# Patient Record
Sex: FEMALE | Race: WHITE | NOT HISPANIC OR LATINO | Employment: FULL TIME | URBAN - METROPOLITAN AREA
[De-identification: names, ages, dates, MRNs, and addresses within clinical notes are randomized per-mention and may not be internally consistent; named-entity substitution may affect disease eponyms.]

---

## 2020-10-20 ENCOUNTER — TRANSFERRED RECORDS (OUTPATIENT)
Dept: HEALTH INFORMATION MANAGEMENT | Facility: CLINIC | Age: 40
End: 2020-10-20

## 2020-10-27 ENCOUNTER — MEDICAL CORRESPONDENCE (OUTPATIENT)
Dept: HEALTH INFORMATION MANAGEMENT | Facility: CLINIC | Age: 40
End: 2020-10-27

## 2020-11-13 ENCOUNTER — TRANSCRIBE ORDERS (OUTPATIENT)
Dept: OTHER | Age: 40
End: 2020-11-13

## 2020-11-13 DIAGNOSIS — M25.441 FINGER JOINT SWELLING, RIGHT: Primary | ICD-10-CM

## 2020-11-13 DIAGNOSIS — M25.549 PAIN OF FINGER JOINT: ICD-10-CM

## 2020-12-28 ENCOUNTER — VIRTUAL VISIT (OUTPATIENT)
Dept: RHEUMATOLOGY | Facility: CLINIC | Age: 40
End: 2020-12-28
Attending: ORTHOPAEDIC SURGERY
Payer: COMMERCIAL

## 2020-12-28 DIAGNOSIS — M25.50 POLYARTHRALGIA: Primary | ICD-10-CM

## 2020-12-28 DIAGNOSIS — R76.8 POSITIVE ANA (ANTINUCLEAR ANTIBODY): ICD-10-CM

## 2020-12-28 DIAGNOSIS — M51.369 DDD (DEGENERATIVE DISC DISEASE), LUMBAR: ICD-10-CM

## 2020-12-28 DIAGNOSIS — M25.50 POLYARTHRALGIA: ICD-10-CM

## 2020-12-28 LAB
ALBUMIN SERPL-MCNC: 3.8 G/DL (ref 3.4–5)
ALP SERPL-CCNC: 61 U/L (ref 40–150)
ALT SERPL W P-5'-P-CCNC: 35 U/L (ref 0–50)
ANION GAP SERPL CALCULATED.3IONS-SCNC: 2 MMOL/L (ref 3–14)
AST SERPL W P-5'-P-CCNC: 20 U/L (ref 0–45)
BILIRUB SERPL-MCNC: 0.5 MG/DL (ref 0.2–1.3)
BUN SERPL-MCNC: 15 MG/DL (ref 7–30)
CALCIUM SERPL-MCNC: 9 MG/DL (ref 8.5–10.1)
CHLORIDE SERPL-SCNC: 108 MMOL/L (ref 94–109)
CK SERPL-CCNC: 97 U/L (ref 30–225)
CO2 SERPL-SCNC: 28 MMOL/L (ref 20–32)
CREAT SERPL-MCNC: 0.74 MG/DL (ref 0.52–1.04)
CRP SERPL-MCNC: <2.9 MG/L (ref 0–8)
ERYTHROCYTE [DISTWIDTH] IN BLOOD BY AUTOMATED COUNT: 12.8 % (ref 10–15)
ERYTHROCYTE [SEDIMENTATION RATE] IN BLOOD BY WESTERGREN METHOD: 15 MM/H (ref 0–20)
GFR SERPL CREATININE-BSD FRML MDRD: >90 ML/MIN/{1.73_M2}
GLUCOSE SERPL-MCNC: 91 MG/DL (ref 70–99)
HCT VFR BLD AUTO: 40.7 % (ref 35–47)
HGB BLD-MCNC: 13.4 G/DL (ref 11.7–15.7)
MCH RBC QN AUTO: 30.9 PG (ref 26.5–33)
MCHC RBC AUTO-ENTMCNC: 32.9 G/DL (ref 31.5–36.5)
MCV RBC AUTO: 94 FL (ref 78–100)
PLATELET # BLD AUTO: 231 10E9/L (ref 150–450)
POTASSIUM SERPL-SCNC: 4.1 MMOL/L (ref 3.4–5.3)
PROT SERPL-MCNC: 7.5 G/DL (ref 6.8–8.8)
RBC # BLD AUTO: 4.34 10E12/L (ref 3.8–5.2)
SODIUM SERPL-SCNC: 138 MMOL/L (ref 133–144)
WBC # BLD AUTO: 4.9 10E9/L (ref 4–11)

## 2020-12-28 PROCEDURE — 86200 CCP ANTIBODY: CPT | Performed by: STUDENT IN AN ORGANIZED HEALTH CARE EDUCATION/TRAINING PROGRAM

## 2020-12-28 PROCEDURE — 99204 OFFICE O/P NEW MOD 45 MIN: CPT | Mod: 95 | Performed by: STUDENT IN AN ORGANIZED HEALTH CARE EDUCATION/TRAINING PROGRAM

## 2020-12-28 PROCEDURE — 86140 C-REACTIVE PROTEIN: CPT | Performed by: STUDENT IN AN ORGANIZED HEALTH CARE EDUCATION/TRAINING PROGRAM

## 2020-12-28 PROCEDURE — 80053 COMPREHEN METABOLIC PANEL: CPT | Performed by: STUDENT IN AN ORGANIZED HEALTH CARE EDUCATION/TRAINING PROGRAM

## 2020-12-28 PROCEDURE — 86160 COMPLEMENT ANTIGEN: CPT | Performed by: STUDENT IN AN ORGANIZED HEALTH CARE EDUCATION/TRAINING PROGRAM

## 2020-12-28 PROCEDURE — 82550 ASSAY OF CK (CPK): CPT | Performed by: STUDENT IN AN ORGANIZED HEALTH CARE EDUCATION/TRAINING PROGRAM

## 2020-12-28 PROCEDURE — 85652 RBC SED RATE AUTOMATED: CPT | Performed by: STUDENT IN AN ORGANIZED HEALTH CARE EDUCATION/TRAINING PROGRAM

## 2020-12-28 PROCEDURE — 85027 COMPLETE CBC AUTOMATED: CPT | Performed by: STUDENT IN AN ORGANIZED HEALTH CARE EDUCATION/TRAINING PROGRAM

## 2020-12-28 PROCEDURE — 86235 NUCLEAR ANTIGEN ANTIBODY: CPT | Performed by: STUDENT IN AN ORGANIZED HEALTH CARE EDUCATION/TRAINING PROGRAM

## 2020-12-28 PROCEDURE — 86225 DNA ANTIBODY NATIVE: CPT | Performed by: STUDENT IN AN ORGANIZED HEALTH CARE EDUCATION/TRAINING PROGRAM

## 2020-12-28 PROCEDURE — 86039 ANTINUCLEAR ANTIBODIES (ANA): CPT | Performed by: STUDENT IN AN ORGANIZED HEALTH CARE EDUCATION/TRAINING PROGRAM

## 2020-12-28 PROCEDURE — 86431 RHEUMATOID FACTOR QUANT: CPT | Performed by: STUDENT IN AN ORGANIZED HEALTH CARE EDUCATION/TRAINING PROGRAM

## 2020-12-28 PROCEDURE — 36415 COLL VENOUS BLD VENIPUNCTURE: CPT | Performed by: STUDENT IN AN ORGANIZED HEALTH CARE EDUCATION/TRAINING PROGRAM

## 2020-12-28 PROCEDURE — 86038 ANTINUCLEAR ANTIBODIES: CPT | Performed by: STUDENT IN AN ORGANIZED HEALTH CARE EDUCATION/TRAINING PROGRAM

## 2020-12-28 RX ORDER — FLUOXETINE 40 MG/1
40 CAPSULE ORAL
COMMUNITY
Start: 2020-12-10

## 2020-12-28 NOTE — LETTER
December 31, 2020      Pablo Ellington  [address] [place] [address]        Dear ,    We are writing to inform you of your test results.    Your blood test so far are normal. Liver, kidney function test, CBC, inflammation marker ESR CRP are normal.  Rest of the labs are pending.     Resulted Orders   Comprehensive metabolic panel   Result Value Ref Range    Sodium 138 133 - 144 mmol/L    Potassium 4.1 3.4 - 5.3 mmol/L    Chloride 108 94 - 109 mmol/L    Carbon Dioxide 28 20 - 32 mmol/L    Anion Gap 2 (L) 3 - 14 mmol/L    Glucose 91 70 - 99 mg/dL      Comment:      Non Fasting    Urea Nitrogen 15 7 - 30 mg/dL    Creatinine 0.74 0.52 - 1.04 mg/dL    GFR Estimate >90 >60 mL/min/[1.73_m2]      Comment:      Non  GFR Calc  Starting 12/18/2018, serum creatinine based estimated GFR (eGFR) will be   calculated using the Chronic Kidney Disease Epidemiology Collaboration   (CKD-EPI) equation.      GFR Estimate If Black >90 >60 mL/min/[1.73_m2]      Comment:       GFR Calc  Starting 12/18/2018, serum creatinine based estimated GFR (eGFR) will be   calculated using the Chronic Kidney Disease Epidemiology Collaboration   (CKD-EPI) equation.      Calcium 9.0 8.5 - 10.1 mg/dL    Bilirubin Total 0.5 0.2 - 1.3 mg/dL    Albumin 3.8 3.4 - 5.0 g/dL    Protein Total 7.5 6.8 - 8.8 g/dL    Alkaline Phosphatase 61 40 - 150 U/L    ALT 35 0 - 50 U/L    AST 20 0 - 45 U/L   CBC with platelets   Result Value Ref Range    WBC 4.9 4.0 - 11.0 10e9/L    RBC Count 4.34 3.8 - 5.2 10e12/L    Hemoglobin 13.4 11.7 - 15.7 g/dL    Hematocrit 40.7 35.0 - 47.0 %    MCV 94 78 - 100 fl    MCH 30.9 26.5 - 33.0 pg    MCHC 32.9 31.5 - 36.5 g/dL    RDW 12.8 10.0 - 15.0 %    Platelet Count 231 150 - 450 10e9/L   CK total   Result Value Ref Range    CK Total 97 30 - 225 U/L   CRP inflammation   Result Value Ref Range    CRP Inflammation <2.9 0.0 - 8.0 mg/L   Erythrocyte sedimentation rate auto   Result Value Ref Range     Sed Rate 15 0 - 20 mm/h       If you have any questions or concerns, please call the clinic at the number listed above.       Sincerely,      Dorota Giles MD

## 2020-12-28 NOTE — PROGRESS NOTES
"Pablo Ellington is a 40 year old female who is being evaluated via a billable video visit.      The patient has been notified of following:     \"This video visit will be conducted via a call between you and your physician/provider. We have found that certain health care needs can be provided without the need for an in-person physical exam.  This service lets us provide the care you need with a video conversation.  If a prescription is necessary we can send it directly to your pharmacy.  If lab work is needed we can place an order for that and you can then stop by our lab to have the test done at a later time.    Video visits are billed at different rates depending on your insurance coverage.  Please reach out to your insurance provider with any questions.    If during the course of the call the physician/provider feels a video visit is not appropriate, you will not be charged for this service.\"    Video visit - please send text invite to [phone]  Patient has given verbal consent for Video visit? Yes  How would you like to obtain your AVS? Mail a copy  If you are dropped from the video visit, the video invite should be resent to: Text to cell phone: [phone]  Will anyone else be joining your video visit? No      Ciara Denise LPN    Rheumatology / Pulmonology  [facility]           Active Problem List:     Patient Active Problem List    Diagnosis Date Noted     Positive MONROE (antinuclear antibody)      Priority: Medium     Polyarthralgia      Priority: Medium     DDD (degenerative disc disease), lumbar      Priority: Medium            History of Present Illness:   Pablo Ellington is a 40 year old female with no significant PMH evaluated via a billable virtual visit in consultation at request of Dr Paredes in Orthopedics for evaluation of right second MCP joint pain.     She reports pain in her right MCP joint for more than a year.  In September her joint got very swollen and painful.  " Denies pain in other MCP, PIP, DIP joints or wrists.  Denies any pain in elbows, ankles, MTP joints.  She has noticed achiness in bilateral shoulders, neck, lower back and occasionally in the right big toe.  In the morning she has stiffness in hands, back, shoulder, neck.  Stiffness is mostly in her right second MCP joint but also noted in the right ring finger and thumb. She take ibuprofen 400 mg 1 - 2 times a day.  She has seen chiropractor and was diagnosed with degenerative disc disease in the lumbar spine.      She had autoimmune testing done in May 2020 which revealed positive MONROE of 1: 160, negative rheumatoid factor, anti-CCP, elevated sed rate of 21, negative IZABEL panel, normal complements.  Her double-stranded DNA was elevated at 10 in May but repeat testing 2 weeks later was normal.    She received intra-articular steroid injection by orthopedic in the second right MCP joint which helped to reduce the pain and inflammation but for the last few weeks again she has noticed swelling and pain coming back.    She reports family history of lupus in her mother and history of rheumatoid arthritis in her maternal aunt.        She has noticed left sided chest pains. She had plantar fascitis a year ago, it is better now.  Denies history of psoriasis, ulcerative colitis or chron's disease. No h/o iritis, enthesitis, finger or toe swelling like dactylitis or heel pain. She has seen white discoloration in her fingers for years.     Denies malar rash, photosensitivity, recurrent mouth/genital ulcers, sicca symptoms, pleuritic chest pains, recurrent sinusitis/rhinitis, swallowing difficulty, hearing or visual changes recently. No h/o arterial/venous thrombosis in the past. Denies any tick bite, recent GI/ infection.             Review of Systems:     Review Of Systems  Constitutional: denies fever, chills, night sweats and weight loss.  Skin: No skin rash.  Eyes: No dryness or irritation in eyes. No episode of eye  inflammation or redness.   Ears/Nose/Throat: no recurrent sinus infections.  Respiratory: No shortness of breath, dyspnea on exertion, cough, or hemoptysis  Cardiovascular: + chest pain or palpitations.  Gastrointestinal: no nausea, vomiting, abdominal pain.  Normal bowel movements.  Genitourinary: no dysuria, frequency  or hematuria.  Musculoskeletal: as in HPI  Neurologic: no numbness, tingling.  Psychiatric: no mood disorders.  Hematologic/Lymphatic/Immunologic: no history of easy bruising, petechia or purpura.  No abnormal bleeding.   Endocrine: no h/o thyroid disease or Diabetes.                  Past Medical History:     Past Medical History:   Diagnosis Date     DDD (degenerative disc disease), lumbar      Polyarthralgia      Positive MONROE (antinuclear antibody)      Past Surgical History:   Procedure Laterality Date     NO HISTORY OF SURGERY              Social History:     Social History     Occupational History     Not on file   Tobacco Use     Smoking status: Not on file   Substance and Sexual Activity     Alcohol use: Not on file     Drug use: Not on file     Sexual activity: Not on file            Family History:   History reviewed. No pertinent family history.         Allergies:   No Known Allergies         Medications:     Current Outpatient Medications   Medication Sig Dispense Refill     FLUoxetine (PROZAC) 40 MG capsule Take 40 mg by mouth              Physical Exam:   Vitals not taken.   Wt Readings from Last 4 Encounters:   No data found for Wt       Constitutional: Obese, appearing stated age; cooperative  MS: Slight swelling and tenderness noted over the right second MCP joint.  No swelling noted over other MCP, PIP joint.  She can make a complete fist.  No tenderness reported over bilateral wrists, elbows.  Range of motion of bilateral shoulders is painful.  No knee effusions.  No tenderness reported bilateral ankles, MTP joints.  Psych: nl judgement, orientation, memory, affect.         Data:      Results for orders placed or performed in visit on 12/28/20   CBC with platelets     Status: None   Result Value Ref Range    WBC 4.9 4.0 - 11.0 10e9/L    RBC Count 4.34 3.8 - 5.2 10e12/L    Hemoglobin 13.4 11.7 - 15.7 g/dL    Hematocrit 40.7 35.0 - 47.0 %    MCV 94 78 - 100 fl    MCH 30.9 26.5 - 33.0 pg    MCHC 32.9 31.5 - 36.5 g/dL    RDW 12.8 10.0 - 15.0 %    Platelet Count 231 150 - 450 10e9/L     Hemoglobin   Date Value Ref Range Status   12/28/2020 13.4 11.7 - 15.7 g/dL Final     Recent Labs   Lab Test 12/28/20  0944   WBC 4.9   HGB 13.4   HCT 40.7   MCV 94          Outside studies reviewed: Records from [place] [facility] reviewed    Reviewed Rheumatology lab flowsheet    Assessment    Polyarthralgia  Lumbar degenerative disc disease  Positive MONROE-1: 160  Family history of lupus in mother  Negative rheumatoid factor, anti-CCP-May 2020    Polyarthralgia: She has pain in the right second MCP joint for more than a year.  She has noticed mild stiffness in her right second, fourth MCP joint and the thumb joint.  Pain has been there for more than a year.  Intra-articular steroid injection done in September in the right second MCP joint helped to reduce the inflammation but it is coming back.  Her rheumatoid arthritis tests done in May 2020 were negative.  I will repeat her rheumatoid serologies, inflammatory markers.  She had positive MONROE which will be repeated.  Due to family history of lupus in her mother will get other specific serologies including IZABEL panel, complements, double-stranded DNA, centromere antibody.  She does reports history of Raynaud's-like symptoms in her fingertips.    She has history of chronic pain in her lower back, neck and shoulder which could be related to degenerative disc disease.    If blood test will be normal then MRI of the right hand will be done to look for synovitis.    She can use ibuprofen 400-600 mg 2-3 times a day on as-needed basis.  In addition Voltaren gel can  be used topically over the painful joint.    Plan    Blood tests ordered    Based on the blood test results MRI can be considered    Follow-up in 4 weeks.          Video-Visit Details    Type of service:  Video Visit    Video Start Time: 8:40 AM   Video End Time: 9:20 AM     Originating Location (pt. Location): Home    Distant Location (provider location):  [facility]     Platform used for Video Visit: Anne Giles MD

## 2020-12-28 NOTE — RESULT ENCOUNTER NOTE
Karissa Shah,     Your blood test so far are normal. Liver, kidney function test, CBC, inflammation marker ESR CRP are normal.  Rest of the labs are pending.    Dorota Giles MD

## 2020-12-28 NOTE — LETTER
February 5, 2021      Pablo Ellington  [address] [place] [address]        Dear ,    We are writing to inform you of your test results.    Your autoimmune work-up has shown positive MONROE, positive double-stranded DNA and slightly positive RNP antibody.  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  You do not meet criteria for systemic lupus but due to family history of lupus in mother there is a concern about undifferentiated process which may or may not evolve into systemic lupus.     Since your hand x-ray was normal, I will order MRI of the right hand to evaluate for inflammation in the joint. Please call [phone] to schedule the MRI.     Resulted Orders   Comprehensive metabolic panel   Result Value Ref Range    Sodium 138 133 - 144 mmol/L    Potassium 4.1 3.4 - 5.3 mmol/L    Chloride 108 94 - 109 mmol/L    Carbon Dioxide 28 20 - 32 mmol/L    Anion Gap 2 (L) 3 - 14 mmol/L    Glucose 91 70 - 99 mg/dL      Comment:      Non Fasting    Urea Nitrogen 15 7 - 30 mg/dL    Creatinine 0.74 0.52 - 1.04 mg/dL    GFR Estimate >90 >60 mL/min/[1.73_m2]      Comment:      Non  GFR Calc  Starting 12/18/2018, serum creatinine based estimated GFR (eGFR) will be   calculated using the Chronic Kidney Disease Epidemiology Collaboration   (CKD-EPI) equation.      GFR Estimate If Black >90 >60 mL/min/[1.73_m2]      Comment:       GFR Calc  Starting 12/18/2018, serum creatinine based estimated GFR (eGFR) will be   calculated using the Chronic Kidney Disease Epidemiology Collaboration   (CKD-EPI) equation.      Calcium 9.0 8.5 - 10.1 mg/dL    Bilirubin Total 0.5 0.2 - 1.3 mg/dL    Albumin 3.8 3.4 - 5.0 g/dL    Protein Total 7.5 6.8 - 8.8 g/dL    Alkaline Phosphatase 61 40 - 150 U/L    ALT 35 0 - 50 U/L    AST 20 0 - 45 U/L   CBC with platelets   Result Value Ref Range    WBC 4.9 4.0 - 11.0 10e9/L    RBC Count 4.34 3.8 -  5.2 10e12/L    Hemoglobin 13.4 11.7 - 15.7 g/dL    Hematocrit 40.7 35.0 - 47.0 %    MCV 94 78 - 100 fl    MCH 30.9 26.5 - 33.0 pg    MCHC 32.9 31.5 - 36.5 g/dL    RDW 12.8 10.0 - 15.0 %    Platelet Count 231 150 - 450 10e9/L   Anti Nuclear Alejandrina IgG by IFA with Reflex   Result Value Ref Range    MONROE interpretation Borderline Positive (A) NEG^Negative      Comment:                                         Reference range:  <1:40  NEGATIVE  1:40 - 1:80  BORDERLINE POSITIVE  >1:80 POSITIVE      MONROE pattern 1 SPECKLED     MONROE titer 1 1:80    Rheumatoid factor   Result Value Ref Range    Rheumatoid Factor <7 <12 IU/mL   CK total   Result Value Ref Range    CK Total 97 30 - 225 U/L   Centromere Antibody IgG   Result Value Ref Range    Centromere Antibody IgG <0.2 0.0 - 0.9 AI      Comment:      Negative  Antibody index (AI) values reflect qualitative changes in antibody   concentration that cannot be directly associated with clinical condition or   disease state.     CRP inflammation   Result Value Ref Range    CRP Inflammation <2.9 0.0 - 8.0 mg/L   DNA double stranded antibodies   Result Value Ref Range    DNA-ds 15 (H) <10 IU/mL      Comment:      Equivocal (qualifier value)   Complement C4   Result Value Ref Range    Complement C4 21 13 - 39 mg/dL   Complement C3   Result Value Ref Range    Complement C3 126 81 - 157 mg/dL   IZABEL antibody panel   Result Value Ref Range    RNP Antibody IgG 2.4 (H) 0.0 - 0.9 AI      Comment:      Positive  Antibody index (AI) values reflect qualitative changes in antibody   concentration that cannot be directly associated with clinical condition or   disease state.      Hook IZABEL Antibody IgG <0.2 0.0 - 0.9 AI      Comment:      Negative  Antibody index (AI) values reflect qualitative changes in antibody   concentration that cannot be directly associated with clinical condition or   disease state.      SSA (Ro) (IZABEL) Antibody, IgG <0.2 0.0 - 0.9 AI      Comment:      Negative  Antibody index  (AI) values reflect qualitative changes in antibody   concentration that cannot be directly associated with clinical condition or   disease state.      SSB (La) (IZABEL) Antibody, IgG <0.2 0.0 - 0.9 AI      Comment:      Negative  Antibody index (AI) values reflect qualitative changes in antibody   concentration that cannot be directly associated with clinical condition or   disease state.     Cyclic Citrullinated Peptide Antibody IgG   Result Value Ref Range    Cyclic Citrullinated Peptide Antibody, IgG 2 <7 U/mL      Comment:      Negative   Erythrocyte sedimentation rate auto   Result Value Ref Range    Sed Rate 15 0 - 20 mm/h               If you have any questions or concerns, please call the clinic at the number listed above.       Sincerely,      Dorota Giles MD

## 2020-12-28 NOTE — PATIENT INSTRUCTIONS
Blood tests ordered     She can get it done today at [place]    Follow up in 4 weeks. FEB 1ST 12:30

## 2020-12-29 LAB
ANA PAT SER IF-IMP: ABNORMAL
ANA SER QL IF: ABNORMAL
ANA TITR SER IF: ABNORMAL {TITER}
C3 SERPL-MCNC: 126 MG/DL (ref 81–157)
C4 SERPL-MCNC: 21 MG/DL (ref 13–39)
CENTROMERE IGG SER-ACNC: <0.2 AI (ref 0–0.9)
ENA RNP IGG SER IA-ACNC: 2.4 AI (ref 0–0.9)
ENA SM IGG SER-ACNC: <0.2 AI (ref 0–0.9)
ENA SS-A IGG SER IA-ACNC: <0.2 AI (ref 0–0.9)
ENA SS-B IGG SER IA-ACNC: <0.2 AI (ref 0–0.9)
RHEUMATOID FACT SER NEPH-ACNC: <7 IU/ML (ref 0–20)

## 2020-12-30 LAB
CCP AB SER IA-ACNC: 2 U/ML
DSDNA AB SER-ACNC: 15 IU/ML

## 2021-02-04 ENCOUNTER — TELEPHONE (OUTPATIENT)
Dept: RHEUMATOLOGY | Facility: CLINIC | Age: 41
End: 2021-02-04

## 2021-02-04 DIAGNOSIS — R76.8 POSITIVE ANA (ANTINUCLEAR ANTIBODY): ICD-10-CM

## 2021-02-04 DIAGNOSIS — M25.50 POLYARTHRALGIA: Primary | ICD-10-CM

## 2021-02-04 NOTE — TELEPHONE ENCOUNTER
[facility] Call Center    Phone Message    May a detailed message be left on voicemail: yes , [phone]    Reason for Call: Order(s): MRI orders  Reason for requested: Pt was seen at Orthopedics today 2/4/21- They recommended that she gets an order for a MRI of right index finger from her Rheumatologist  Date needed: 2/5  Provider name: Dr Giles.      Action Taken: Message routed to:  Adult Clinics: Rheumatology p [id number]    Travel Screening: Not Applicable

## 2021-02-05 NOTE — TELEPHONE ENCOUNTER
Called patient to review Dr. Giles's result note as patient's MyChart status is still pending:    Your autoimmune work-up has shown positive MONROE, positive double-stranded DNA and slightly positive RNP antibody.  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  You do not meet criteria for systemic lupus but due to family history of lupus in mother there is a concern about undifferentiated process which may or may not evolve into systemic lupus.     Since her hand x-ray was normal, I will order MRI of the right hand to evaluate for inflammation in the joint.     Dorota Giles MD       Patient verbalized understanding. She will call to schedule her MRI. She is also requesting a result letter be sent to her home address so she has this in writing. She had no further questions at this time.     Merline Salazar, NELLIN, RN  Medical Specialty Care Coordinator  [facility]

## 2021-02-05 NOTE — RESULT ENCOUNTER NOTE
Karissa Ellington,     Your autoimmune work-up has shown positive MONROE, positive double-stranded DNA and slightly positive RNP antibody.  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  You do not meet criteria for systemic lupus but due to family history of lupus in mother there is a concern about undifferentiated process which may or may not evolve into systemic lupus.     Since her hand x-ray was normal, I will order MRI of the right hand to evaluate for inflammation in the joint.    Dorota Giles MD

## 2021-02-05 NOTE — TELEPHONE ENCOUNTER
Updated patient that MRI order was placed. She was provided the central imaging scheduling number to call and schedule that appointment. She was appreciative and had no further questions at this time.     Merline Salazar, BSN, RN  Medical Specialty Care Coordinator  [facility]

## 2021-02-24 ENCOUNTER — ANCILLARY PROCEDURE (OUTPATIENT)
Dept: MRI IMAGING | Facility: CLINIC | Age: 41
End: 2021-02-24
Attending: STUDENT IN AN ORGANIZED HEALTH CARE EDUCATION/TRAINING PROGRAM
Payer: OTHER MISCELLANEOUS

## 2021-02-24 DIAGNOSIS — R76.8 POSITIVE ANA (ANTINUCLEAR ANTIBODY): ICD-10-CM

## 2021-02-24 DIAGNOSIS — M25.50 POLYARTHRALGIA: ICD-10-CM

## 2021-02-24 PROCEDURE — 73218 MRI UPPER EXTREMITY W/O DYE: CPT | Mod: RT | Performed by: RADIOLOGY

## 2021-03-03 ENCOUNTER — VIRTUAL VISIT (OUTPATIENT)
Dept: RHEUMATOLOGY | Facility: CLINIC | Age: 41
End: 2021-03-03
Payer: COMMERCIAL

## 2021-03-03 DIAGNOSIS — M25.50 POLYARTHRALGIA: Primary | ICD-10-CM

## 2021-03-03 PROCEDURE — 99214 OFFICE O/P EST MOD 30 MIN: CPT | Mod: GT | Performed by: STUDENT IN AN ORGANIZED HEALTH CARE EDUCATION/TRAINING PROGRAM

## 2021-03-03 RX ORDER — PREDNISONE 5 MG/1
TABLET ORAL
Qty: 100 TABLET | Refills: 1 | Status: SHIPPED | OUTPATIENT
Start: 2021-03-03

## 2021-03-03 NOTE — PROGRESS NOTES
Pablo is a 40 year old who is being evaluated via a billable video visit.      How would you like to obtain your AVS? MyChart  If the video visit is dropped, the invitation should be resent by: Text to cell phone: [phone]  Will anyone else be joining your video visit? Lily Baca [facility]    Video Start Time: 4:33 PM         Active Problem List:     Patient Active Problem List    Diagnosis Date Noted     Positive MONROE (antinuclear antibody)      Priority: Medium     Polyarthralgia      Priority: Medium     DDD (degenerative disc disease), lumbar      Priority: Medium            History of Present Illness:   Pablo Ellington is a 40 year old female with no significant PMH of Raynaud's, degenerative disc disease, joint pains evaluated via a billable virtual visit in consultation at request of Dr Paredes in Orthopedics for evaluation of right second MCP joint pain.     HPI from initial visit : She reports pain in her right MCP joint for more than a year.  In September her joint got very swollen and painful.  Denies pain in other MCP, PIP, DIP joints or wrists.  Denies any pain in elbows, ankles, MTP joints.  She has noticed achiness in bilateral shoulders, neck, lower back and occasionally in the right big toe.  In the morning she has stiffness in hands, back, shoulder, neck.  Stiffness is mostly in her right second MCP joint but also noted in the right ring finger and thumb. She take ibuprofen 400 mg 1 - 2 times a day.  She has seen chiropractor and was diagnosed with degenerative disc disease in the lumbar spine.      She had autoimmune testing done in May 2020 which revealed positive MONROE of 1: 160, negative rheumatoid factor, anti-CCP, elevated sed rate of 21, negative IZABEL panel, normal complements.  Her double-stranded DNA was elevated at 10 in May but repeat testing 2 weeks later was normal.    She received intra-articular steroid injection by orthopedic in the second right MCP joint which helped to  reduce the pain and inflammation but for the last few weeks again she has noticed swelling and pain coming back.    She reports family history of lupus in her mother and history of rheumatoid arthritis in her maternal aunt.        She has noticed left sided chest pains. She had plantar fascitis a year ago, it is better now.  Denies history of psoriasis, ulcerative colitis or chron's disease. No h/o iritis, enthesitis, finger or toe swelling like dactylitis or heel pain. She has seen white discoloration in her fingers for years.     Denies malar rash, photosensitivity, recurrent mouth/genital ulcers, sicca symptoms, pleuritic chest pains, recurrent sinusitis/rhinitis, swallowing difficulty, hearing or visual changes recently. No h/o arterial/venous thrombosis in the past. Denies any tick bite, recent GI/ infection.     March 3, 2021 -  She still has lot of pain and swelling in her right second MCP. Shoulders and back hurt. Left big toe hurt. Sometimes ankles hurt. She can not open anything with her right hand. Once in while she feel tightness in  left side of chest which last for few seconds and goes away. She has Raynaud's in her fingers. Reports mild dry eyes and mouth. She denies any fatigue, night sweats.  MRI of the right hand done on 2/24/2021 showed small joint effusion with internal debris/synovial proliferation at the second MCP joint with a few nonspecific subchondral cystlike changes.  Her autoimmune work-up done on 12/28/2020 showed borderline positive MONROE-1: 80 speckled, positive RNP-2.4, positive dsDNA-15, normal C3/C4, negative rheumatoid factor, anti-CCP, normal ESR, CRP, negative IZABEL panel.            Review of Systems:     Review Of Systems  Constitutional: denies fever, chills, night sweats and weight loss.  Skin: No skin rash.  Eyes: No dryness or irritation in eyes. No episode of eye inflammation or redness.   Ears/Nose/Throat: no recurrent sinus infections.  Respiratory: No shortness of  breath, dyspnea on exertion, cough, or hemoptysis  Cardiovascular: + chest pain or palpitations.  Gastrointestinal: no nausea, vomiting, abdominal pain.  Normal bowel movements.  Genitourinary: no dysuria, frequency  or hematuria.  Musculoskeletal: as in HPI  Neurologic: no numbness, tingling.  Psychiatric: no mood disorders.  Hematologic/Lymphatic/Immunologic: no history of easy bruising, petechia or purpura.  No abnormal bleeding.   Endocrine: no h/o thyroid disease or Diabetes.                  Past Medical History:     Past Medical History:   Diagnosis Date     DDD (degenerative disc disease), lumbar      Polyarthralgia      Positive MONROE (antinuclear antibody)      Past Surgical History:   Procedure Laterality Date     NO HISTORY OF SURGERY              Social History:     Social History     Occupational History     Not on file   Tobacco Use     Smoking status: Not on file   Substance and Sexual Activity     Alcohol use: Not on file     Drug use: Not on file     Sexual activity: Not on file            Family History:     Family History   Problem Relation Age of Onset     Lupus Mother             Allergies:   No Known Allergies         Medications:     Current Outpatient Medications   Medication Sig Dispense Refill     FLUoxetine (PROZAC) 40 MG capsule Take 40 mg by mouth              Physical Exam:   Vitals not taken.   Wt Readings from Last 4 Encounters:   No data found for Wt       Constitutional: Obese, appearing stated age; cooperative  MS: Slight swelling and tenderness noted over the right second MCP joint.  No swelling noted over other MCP, PIP joint.  She can make a complete fist.  No tenderness reported over bilateral wrists, elbows.  Range of motion of bilateral shoulders is normal but painful.  No knee effusions.  No tenderness reported bilateral ankles, MTP joints.  Psych: nl judgement, orientation, memory, affect.         Data:     No results found for any visits on 03/03/21.  Hemoglobin   Date  Value Ref Range Status   12/28/2020 13.4 11.7 - 15.7 g/dL Final     Urea Nitrogen   Date Value Ref Range Status   12/28/2020 15 7 - 30 mg/dL Final     Sed Rate   Date Value Ref Range Status   12/28/2020 15 0 - 20 mm/h Final     CRP Inflammation   Date Value Ref Range Status   12/28/2020 <2.9 0.0 - 8.0 mg/L Final     AST   Date Value Ref Range Status   12/28/2020 20 0 - 45 U/L Final     Albumin   Date Value Ref Range Status   12/28/2020 3.8 3.4 - 5.0 g/dL Final     Alkaline Phosphatase   Date Value Ref Range Status   12/28/2020 61 40 - 150 U/L Final     ALT   Date Value Ref Range Status   12/28/2020 35 0 - 50 U/L Final     Rheumatoid Factor   Date Value Ref Range Status   12/28/2020 <7 <12 IU/mL Final     Recent Labs   Lab Test 12/28/20  0944   WBC 4.9   HGB 13.4   HCT 40.7   MCV 94      BUN 15   AST 20   ALT 35   ALKPHOS 61       Outside studies reviewed: Records from [facility] reviewed    Reviewed Rheumatology lab flowsheet    Assessment    Polyarthralgia  Right second MCP joint synovitis  Lumbar degenerative disc disease  Positive MONROE-1: 160,+ dsDNA-15, + RNP-2.4  Family history of lupus in mother  Raynaud's  Negative rheumatoid factor, anti-CCP, normal ESR, CRP  MRI right hand-synovitis, effusion of second MCP joint with possible subchondral cystic changes    Polyarthralgia: She has pain in the right second MCP joint for more than a year.  She has noticed mild stiffness in her right second, fourth MCP joint and the thumb joint.  Pain has been there for more than a year.  Intra-articular steroid injection done in September 2020 in the right second MCP joint helped to reduce the inflammation but it came back.  Her rheumatoid arthritis tests are negative.  Autoimmune work-up done in December 2020 showed positive MONROE-1: 80 speckled, positive dsDNA, positive RNP, negative rheumatoid factor, anti-CCP, normal ESR, CRP.  She has family history of lupus in her mother.  She does report having Raynaud's in  her fingertips.  Based on these abnormal results there is a concern about seronegative inflammatory process and undifferentiated autoimmune connective tissue disease.  MRI of her right hand did show effusion and synovitis of the second MCP joint with possible erosive disease.  Interestingly no other MCP, PIP joints are involved which is atypical of rheumatoid arthritis.  But due to chronic inflammation in the joint I will give her prednisone taper starting with 20 mg dose.  Based on her response to prednisone will start her on hydroxychloroquine.  Side effects of prednisone including palpitations, mood changes, increased appetite, weight gain, edema discussed with the patient.    She has history of chronic pain in her lower back, neck and shoulder which is most likely related to degenerative disc disease.    She can use ibuprofen 400-600 mg 2-3 times a day on as-needed basis.  In addition Voltaren gel can be used topically over the painful joint.    Plan    I will send prednisone taper for joint pain.     MRI of your right hand did show some inflammation of the second knuckle raising concern about inflammatory arthritis.    If prednisone helps significantly then will start hydroxychloroquine.    Follow-up in 4 - 6 weeks, March 29th, 12:15           Video-Visit Details    Type of service:  Video Visit    Video End Time: 5:06 PM      Originating Location (pt. Location): Home    Distant Location (provider location):  [facility]     Platform used for Video Visit: Anne Giles MD  [facility]  Department of Rheumatology & Autoimmune Disorders  [facility]: [phone]  Pager - [phone]

## 2021-03-03 NOTE — RESULT ENCOUNTER NOTE
Results discussed with the patient at the time of visit.     Dorota Giles MD   3/3/2021  4:38 PM

## 2021-03-03 NOTE — PATIENT INSTRUCTIONS
We will send a prednisone taper for joint pain.    Your MRI of your right hand did show some inflammation of the second knuckle raising concern about inflammatory arthritis.    If prednisone helps significantly then will start hydroxychloroquine.    Follow-up in 4 - 6 weeks, March 29th, 12:15

## 2021-03-08 ENCOUNTER — MYC MEDICAL ADVICE (OUTPATIENT)
Dept: RHEUMATOLOGY | Facility: CLINIC | Age: 41
End: 2021-03-08

## 2021-03-09 ENCOUNTER — TELEPHONE (OUTPATIENT)
Dept: RHEUMATOLOGY | Facility: CLINIC | Age: 41
End: 2021-03-09

## 2021-03-09 NOTE — TELEPHONE ENCOUNTER
Prior Authorization Retail Medication Request    Medication/Dose: predniSONE (DELTASONE) 5 MG tablet  ICD code (if different than what is on RX):  Polyarthralgia [M25.50]  Previously Tried and Failed:    Rationale:      Insurance Name: OPTUM_IRX  Insurance ID:  [id number]    Pharmacy Information (if different than what is on RX)  Name:  [id number] - [place] - [address] AT [facility] OF [facility] & [place]  Phone:  [phone]

## 2021-03-10 NOTE — TELEPHONE ENCOUNTER
PA Initiation    Medication: predniSONE (DELTASONE) 5 MG tablet -   Insurance Company: [employer] ([facility]) - Phone [phone] Fax [phone]  Pharmacy Filling the Rx: [facility] #[id number] - [place] - [address] AT [facility] OF [facility] & [facility]  Filling Pharmacy Phone: [phone]  Filling Pharmacy Fax: [phone]  Start Date: 3/10/2021

## 2021-03-18 NOTE — TELEPHONE ENCOUNTER
Prior Authorization Not Needed per Insurance    Medication: predniSONE (DELTASONE) 5 MG tablet -   Insurance Company: [employer] ([facility]) - Phone [phone] Fax [phone]  Expected CoPay:      Pharmacy Filling the Rx: [facility] #[id number] -  [place], [place] - [address] AT [facility] OF [address] [place] [address]  Pharmacy Notified: Yes  Patient Notified: Comment:  Pt picked up

## 2021-03-29 ENCOUNTER — VIRTUAL VISIT (OUTPATIENT)
Dept: RHEUMATOLOGY | Facility: CLINIC | Age: 41
End: 2021-03-29
Payer: COMMERCIAL

## 2021-03-29 ENCOUNTER — MYC MEDICAL ADVICE (OUTPATIENT)
Dept: RHEUMATOLOGY | Facility: CLINIC | Age: 41
End: 2021-03-29

## 2021-03-29 DIAGNOSIS — R76.8 POSITIVE ANA (ANTINUCLEAR ANTIBODY): Primary | ICD-10-CM

## 2021-03-29 DIAGNOSIS — M25.50 POLYARTHRALGIA: ICD-10-CM

## 2021-03-29 PROCEDURE — 99214 OFFICE O/P EST MOD 30 MIN: CPT | Mod: GT | Performed by: STUDENT IN AN ORGANIZED HEALTH CARE EDUCATION/TRAINING PROGRAM

## 2021-03-29 RX ORDER — PREDNISONE 5 MG/1
TABLET ORAL
Qty: 100 TABLET | Refills: 2 | Status: SHIPPED | OUTPATIENT
Start: 2021-03-29

## 2021-03-29 RX ORDER — HYDROXYCHLOROQUINE SULFATE 200 MG/1
200 TABLET, FILM COATED ORAL 2 TIMES DAILY
Qty: 180 TABLET | Refills: 3 | Status: SHIPPED | OUTPATIENT
Start: 2021-03-29

## 2021-03-29 NOTE — PATIENT INSTRUCTIONS
Start hydroxychloroquine 400 mg daily    Send hydroxychloroquine inflammation    You can restart prednisone 15 mg and taper by 2.5 mg every week until you reach at 7.5 mg dose.    Follow back in 6 weeks.  Patient Education     Plaquenil Oral Tablet 200 mg  Uses  This medicine is used for the following purposes:    arthritis    autoimmune disorder    blood disorder    prevent malaria    malaria    calcium removal    skin inflammation    infections caused by bacteria  Instructions  Take the medicine with food.  Store at room temperature in a dry place. Do not keep in the bathroom.  Keep the medicine away from heat and light.  Do not take antacids within 4 hours before or after this medicine.  Tell your doctor if you have severe or persistent sweating, diarrhea or vomiting. These can increase your risk of a serious side effect.  This medicine may cause you to become more sensitive to the sun. Use sunscreen or wear protective clothing when you are exposed to the sun.  It is important that you keep taking each dose of this medicine on time even if you are feeling well.  If you forget to take a dose on time, take it as soon as you remember. If it is almost time for the next dose, do not take the missed dose. Return to your normal dosing schedule. Do not take 2 doses of this medicine at one time.  Please tell your doctor and pharmacist about all the medicines you take. Include both prescription and over-the-counter medicines. Also tell them about any vitamins, herbal medicines, or anything else you take for your health.  If your symptoms do not improve or they worsen while on this medicine, contact your doctor.  This medicine may cause low blood sugar. It is very important to have regular meals and exercise regularly as instructed by your doctor. Notify your doctor if you experience symptoms of low blood sugar.  Symptoms of low blood sugar may include nausea, shaking, sweating, cold skin, fast heartbeat, hunger, and  irritability.  It is very important that you keep all appointments for medical exams and tests while on this medicine.  Cautions  Tell your doctor and pharmacist if you ever had an allergic reaction to a medicine. Symptoms of an allergic reaction can include trouble breathing, skin rash, itching, swelling, or severe dizziness.  This medicine may increase risk for an abnormal, life-threatening heart rhythm. This risk increases with some medical conditions. Combination with other medicines that increase QT prolongation, such as azithromycin, can also increase risk. Before starting this medicine, make sure your provider is aware of all medicines and medical conditions.  This medicine is associated with a rare but very serious medical condition. Please speak with your doctor about symptoms you should look out for while on this medicine. Notify your doctor immediately if you develop those symptoms.  Some patients taking this medicine have experienced serious side effects. Please speak with your doctor to understand the risks and benefits associated with this medicine.  Taking this medicine in high doses or for a long time can damage the eyes and cause blindness. Stop taking this medicine and call your doctor immediately if you notice any changes in vision. These changes may be blurred vision, light streaks or flashes, or swelling or color changes in the eye.  Do not use the medication any more than instructed.  Your ability to stay alert or to react quickly may be impaired by this medicine. Do not drive or operate machinery until you know how this medicine will affect you.  Please check with your doctor before drinking alcohol while on this medicine.  If you drink more than a few alcoholic beverages each day, ask your doctor whether you should be on this medicine.  Contact your doctor if you develop any signs of a new infection such as fever, cough, sore throat, or chills.  Tell the doctor or pharmacist if you are  pregnant, planning to be pregnant, or breastfeeding.  Ask your pharmacist if this medicine can interact with any of your other medicines. Be sure to tell them about all the medicines you take.  Please tell all your doctors and dentists that you are on this medicine before they provide care.  Do not start or stop any other medicines without first speaking to your doctor or pharmacist.  Do not share this medicine with anyone who has not been prescribed this medicine.  Side Effects  The following is a list of some common side effects from this medicine. Please speak with your doctor about what you should do if you experience these or other side effects.    decreased appetite    diarrhea    dizziness    headaches    mood changes    nausea    stomach upset or abdominal pain    increased risk of sunburn    vomiting  Call your doctor or get medical help right away if you notice any of these more serious side effects:    loss of balance    change in behavior    bleeding that is severe or takes longer to stop    unusual bruising or discoloration on skin    confusion    depression or feeling sad    ear problems (ringing in the ears, hearing loss)    swelling of the legs, feet, and hands    fainting    changes in hair color    hair loss    hallucinations (unusual thoughts, seeing or hearing things that are not real)    fast or irregular heart beats    slow heartbeat    seeing flashes or halos around lights    sensitivity to light    symptoms of liver damage (such as yellowing of skin or eyes, dark urine, unusual tiredness or weakness; severe stomach or back pain)    uncontrollable movement of face, tongue, arms or legs    muscle aches, spasms or abnormal movements    muscle weakness    feeling of numbness or tingling in your hands and feet    pale or blue skin, lips or fingernails    seizures    shortness of breath    severe stomach or bowel pain    suicidal thoughts    unusual or unexplained tiredness or weakness    blurring  or changes of vision  A few people may have an allergic reactions to this medicine. Symptoms can include difficulty breathing, skin rash, itching, swelling, or severe dizziness. If you notice any of these symptoms, seek medical help quickly.  Extra  Please speak with your doctor, nurse, or pharmacist if you have any questions about this medicine.  [email or web address]  IMPORTANT NOTE: This document tells you briefly how to take your medicine, but it does not tell you all there is to know about it.Your doctor or pharmacist may give you other documents about your medicine. Please talk to them if you have any questions.Always follow their advice. There is a more complete description of this medicine available in English.Scan this code on your smartphone or tablet or use the web address below. You can also ask your pharmacist for a printout. If you have any questions, please ask your pharmacist.     2021 [employer].

## 2021-03-29 NOTE — TELEPHONE ENCOUNTER
Dr. Giles's future lab orders have been faxed to [facility] @ [phone] per patient request. Informed patient of this information via my chart.      Ciara Denise LPN  Pulmonary Medicine:  [facility]  Phone: [phone] Fax: [phone]

## 2021-03-29 NOTE — PROGRESS NOTES
Pablo is a 40 year old who is being evaluated via a billable video visit.      Video visit - patient will login to my chart. If patient is not logged in at 12:15, please text invite to: [phone] (H)    How would you like to obtain your AVS? MyChart  If the video visit is dropped, the invitation should be resent by: Text to cell phone: [phone] (H)  Will anyone else be joining your video visit? No       Ciara Denise LPN  Pulmonary Medicine:  [facility]  Phone: [phone] Fax: [phone]      Video Start Time: 12:12 PM          Active Problem List:     Patient Active Problem List    Diagnosis Date Noted     Positive MONROE (antinuclear antibody)      Priority: Medium     Polyarthralgia      Priority: Medium     DDD (degenerative disc disease), lumbar      Priority: Medium            History of Present Illness:   Pablo Ellington is a 40 year old female with PMH of Raynaud's, degenerative disc disease, joint pains evaluated via a billable virtual visit in consultation at request of Dr Paredes in Orthopedics for evaluation of right second MCP joint pain.     HPI from initial visit : She reports pain in her right MCP joint for more than a year.  In September her joint got very swollen and painful.  Denies pain in other MCP, PIP, DIP joints or wrists.  Denies any pain in elbows, ankles, MTP joints.  She has noticed achiness in bilateral shoulders, neck, lower back and occasionally in the right big toe.  In the morning she has stiffness in hands, back, shoulder, neck.  Stiffness is mostly in her right second MCP joint but also noted in the right ring finger and thumb. She take ibuprofen 400 mg 1 - 2 times a day.  She has seen chiropractor and was diagnosed with degenerative disc disease in the lumbar spine.      She had autoimmune testing done in May 2020 which revealed positive MONROE of 1: 160, negative rheumatoid factor, anti-CCP, elevated sed rate of 21, negative IZABEL panel, normal  complements.  Her double-stranded DNA was elevated at 10 in May but repeat testing 2 weeks later was normal.    She received intra-articular steroid injection by orthopedic in the second right MCP joint which helped to reduce the pain and inflammation but for the last few weeks again she has noticed swelling and pain coming back.    She reports family history of lupus in her mother and history of rheumatoid arthritis in her maternal aunt.        She has noticed left sided chest pains. She had plantar fascitis a year ago, it is better now.  Denies history of psoriasis, ulcerative colitis or chron's disease. No h/o iritis, enthesitis, finger or toe swelling like dactylitis or heel pain. She has seen white discoloration in her fingers for years.     Denies malar rash, photosensitivity, recurrent mouth/genital ulcers, sicca symptoms, pleuritic chest pains, recurrent sinusitis/rhinitis, swallowing difficulty, hearing or visual changes recently. No h/o arterial/venous thrombosis in the past. Denies any tick bite, recent GI/ infection.     March 3, 2021 -  She still has lot of pain and swelling in her right second MCP. Shoulders and back hurt. Left big toe hurt. Sometimes ankles hurt. She can not open anything with her right hand. Once in while she feel tightness in  left side of chest which last for few seconds and goes away. She has Raynaud's in her fingers. Reports mild dry eyes and mouth. She denies any fatigue, night sweats.  MRI of the right hand done on 2/24/2021 showed small joint effusion with internal debris/synovial proliferation at the second MCP joint with a few nonspecific subchondral cystlike changes.  Her autoimmune work-up done on 12/28/2020 showed borderline positive MONROE-1: 80 speckled, positive RNP-2.4, positive dsDNA-15, normal C3/C4, negative rheumatoid factor, anti-CCP, normal ESR, CRP, negative IZABEL panel.    March 29, 2021 - She finished the prednisone taper yesterday, on 20 mg dose her symptoms  were better, but now the swelling and pain over the right second knuckle resolved but on tapering the prednisone symptoms came back.  She also reports pain in her knees, big toes, shoulders, lower back and neck.  Left knee pain started last week.  Denies any side effects with prednisone.            Review of Systems:     Review Of Systems  Constitutional: denies fever, chills, night sweats and weight loss.  Skin: No skin rash.  Eyes: No dryness or irritation in eyes. No episode of eye inflammation or redness.   Ears/Nose/Throat: no recurrent sinus infections.  Respiratory: No shortness of breath, dyspnea on exertion, cough, or hemoptysis  Cardiovascular: + chest pain or palpitations.  Gastrointestinal: no nausea, vomiting, abdominal pain.  Normal bowel movements.  Genitourinary: no dysuria, frequency  or hematuria.  Musculoskeletal: as in HPI  Neurologic: no numbness, tingling.  Psychiatric: no mood disorders.  Hematologic/Lymphatic/Immunologic: no history of easy bruising, petechia or purpura.  No abnormal bleeding.   Endocrine: no h/o thyroid disease or Diabetes.                  Past Medical History:     Past Medical History:   Diagnosis Date     DDD (degenerative disc disease), lumbar      Polyarthralgia      Positive MONROE (antinuclear antibody)      Past Surgical History:   Procedure Laterality Date     NO HISTORY OF SURGERY              Social History:     Social History     Occupational History     Not on file   Tobacco Use     Smoking status: Not on file   Substance and Sexual Activity     Alcohol use: Not on file     Drug use: Not on file     Sexual activity: Not on file            Family History:     Family History   Problem Relation Age of Onset     Lupus Mother             Allergies:   No Known Allergies         Medications:     Current Outpatient Medications   Medication Sig Dispense Refill     FLUoxetine (PROZAC) 40 MG capsule Take 40 mg by mouth       predniSONE (DELTASONE) 5 MG tablet 4tab=20 mg qd x  5 days, 3tab=15 mg qd x 5 days, 2tab=10 mg qd x 5 days, 1 tab=5 mg qd (Patient not taking: Reported on 3/29/2021) 100 tablet 1            Physical Exam:   Vitals not taken.   Wt Readings from Last 4 Encounters:   No data found for Wt       Constitutional: Obese, appearing stated age; cooperative  MS: Slight swelling and tenderness noted over the right second MCP joint.  No swelling noted over other MCP, PIP joint.  She can make a complete fist.  No tenderness reported over bilateral wrists, elbows.  Range of motion of bilateral shoulders is normal but painful.  No knee effusions.  No tenderness reported bilateral ankles, MTP joints.  Psych: nl judgement, orientation, memory, affect.         Data:     No results found for any visits on 03/29/21.  Hemoglobin   Date Value Ref Range Status   12/28/2020 13.4 11.7 - 15.7 g/dL Final     Urea Nitrogen   Date Value Ref Range Status   12/28/2020 15 7 - 30 mg/dL Final     Sed Rate   Date Value Ref Range Status   12/28/2020 15 0 - 20 mm/h Final     CRP Inflammation   Date Value Ref Range Status   12/28/2020 <2.9 0.0 - 8.0 mg/L Final     AST   Date Value Ref Range Status   12/28/2020 20 0 - 45 U/L Final     Albumin   Date Value Ref Range Status   12/28/2020 3.8 3.4 - 5.0 g/dL Final     Alkaline Phosphatase   Date Value Ref Range Status   12/28/2020 61 40 - 150 U/L Final     ALT   Date Value Ref Range Status   12/28/2020 35 0 - 50 U/L Final     Rheumatoid Factor   Date Value Ref Range Status   12/28/2020 <7 <12 IU/mL Final     Recent Labs   Lab Test 12/28/20  0944   WBC 4.9   HGB 13.4   HCT 40.7   MCV 94      BUN 15   AST 20   ALT 35   ALKPHOS 61       Outside studies reviewed: Records from [place] [facility] reviewed    Reviewed Rheumatology lab flowsheet    Assessment    Polyarthralgia  Right second MCP joint synovitis  Lumbar degenerative disc disease  Positive MONROE-1: 160,+ dsDNA-15, + RNP-2.4  Family history of lupus in mother  Raynaud's  Negative rheumatoid factor,  anti-CCP, normal ESR, CRP  MRI right hand-synovitis, effusion of second MCP joint with possible subchondral cystic changes    Polyarthralgia: She has pain in the right second MCP joint for more than a year.  She has noticed mild stiffness in her right second, fourth MCP joint and the thumb joint.  Pain has been there for more than a year.  Intra-articular steroid injection done in September 2020 in the right second MCP joint helped to reduce the inflammation but it came back.  Her rheumatoid arthritis tests are negative.  Autoimmune work-up done in December 2020 showed positive MONROE-1: 80 speckled, positive dsDNA, positive RNP, negative rheumatoid factor, anti-CCP, normal ESR, CRP.  She has family history of lupus in her mother.  She does report having Raynaud's in her fingertips.  Based on these abnormal results there is a concern about seronegative inflammatory process and undifferentiated autoimmune connective tissue disease.  MRI of her right hand did show effusion and synovitis of the second MCP joint with possible erosive disease.  Interestingly no other MCP, PIP joints are involved which is atypical of rheumatoid arthritis.      But due to chronic inflammation in the joint condition taper was given.  She did notice improvements in pain and swelling on 20 mg prednisone.  On tapering the prednisone symptoms came back again.  I will start her on hydroxychloroquine 400 mg daily for seronegative inflammatory process. Plaquenil has a rare side effect of retinal toxicity (1/40,000 before 5 yr of use), the risk could go up to 1% after 5 yr of continous use and is more with doses above 5 mg/kg. It requires an annual eye exam (visual field and OCT).  Was informed that it's slow acting and might take to 3 months to show benefit.    She has history of chronic pain in her lower back, neck and shoulder which is most likely related to degenerative disc disease.    She can use ibuprofen 400-600 mg 2-3 times a day on as-needed  basis.  In addition Voltaren gel can be used topically over the painful joint.    Plan    Start hydroxychloroquine 400 mg daily    Send hydroxychloroquine information    You can restart prednisone 15 mg and taper by 2.5 mg every week until you reach at 7.5 mg dose.    Follow back in 6 weeks with labs, ordered.           Video-Visit Details    Type of service:  Video Visit    Video End Time: 12:30 pm     Originating Location (pt. Location): Home    Distant Location (provider location):  [facility]     Platform used for Video Visit: Anne Giles MD  [facility]  Department of Rheumatology & Autoimmune Disorders  [email or web address] [place]: [phone]  Pager - [phone]

## 2021-03-31 ENCOUNTER — TELEPHONE (OUTPATIENT)
Dept: RHEUMATOLOGY | Facility: CLINIC | Age: 41
End: 2021-03-31

## 2021-03-31 NOTE — TELEPHONE ENCOUNTER
Please review lab results collected on 3/3021 from [facility] in care everywhere. Thank you.      GOLDEN Weiner [facility] Rheumatology

## 2021-04-04 ENCOUNTER — HEALTH MAINTENANCE LETTER (OUTPATIENT)
Age: 41
End: 2021-04-04

## 2021-04-30 NOTE — TELEPHONE ENCOUNTER
Parveen sent to patient notifying her of results.     Merline Salazar, BSN, RN  Medical Specialty Care Coordinator  [facility]

## 2021-04-30 NOTE — TELEPHONE ENCOUNTER
Labs reviewed. She has normal CBC, liver, kidney function test, CRP.  Her sed rate which is marker of inflammation improved to 16.

## 2021-05-10 ENCOUNTER — VIRTUAL VISIT (OUTPATIENT)
Dept: RHEUMATOLOGY | Facility: CLINIC | Age: 41
End: 2021-05-10
Payer: COMMERCIAL

## 2021-05-10 DIAGNOSIS — R76.8 POSITIVE ANA (ANTINUCLEAR ANTIBODY): Primary | ICD-10-CM

## 2021-05-10 DIAGNOSIS — M79.641 RIGHT HAND PAIN: ICD-10-CM

## 2021-05-10 PROCEDURE — 99214 OFFICE O/P EST MOD 30 MIN: CPT | Mod: GT | Performed by: STUDENT IN AN ORGANIZED HEALTH CARE EDUCATION/TRAINING PROGRAM

## 2021-05-10 SDOH — HEALTH STABILITY: MENTAL HEALTH: HOW OFTEN DO YOU HAVE A DRINK CONTAINING ALCOHOL?: NEVER

## 2021-05-10 ASSESSMENT — ANXIETY QUESTIONNAIRES
1. FEELING NERVOUS, ANXIOUS, OR ON EDGE: NEARLY EVERY DAY
3. WORRYING TOO MUCH ABOUT DIFFERENT THINGS: NEARLY EVERY DAY
5. BEING SO RESTLESS THAT IT IS HARD TO SIT STILL: SEVERAL DAYS
GAD7 TOTAL SCORE: 19
6. BECOMING EASILY ANNOYED OR IRRITABLE: NEARLY EVERY DAY
IF YOU CHECKED OFF ANY PROBLEMS ON THIS QUESTIONNAIRE, HOW DIFFICULT HAVE THESE PROBLEMS MADE IT FOR YOU TO DO YOUR WORK, TAKE CARE OF THINGS AT HOME, OR GET ALONG WITH OTHER PEOPLE: EXTREMELY DIFFICULT
2. NOT BEING ABLE TO STOP OR CONTROL WORRYING: NEARLY EVERY DAY
7. FEELING AFRAID AS IF SOMETHING AWFUL MIGHT HAPPEN: NEARLY EVERY DAY

## 2021-05-10 ASSESSMENT — PATIENT HEALTH QUESTIONNAIRE - PHQ9
5. POOR APPETITE OR OVEREATING: NEARLY EVERY DAY
SUM OF ALL RESPONSES TO PHQ QUESTIONS 1-9: 12

## 2021-05-10 NOTE — PATIENT INSTRUCTIONS
We will continue hydroxychloroquine 400 mg daily    Follow-up in 10 weeks in the clinic    Follow-up labs before the next appointment.  Labs to be sent to local clinic    Please write note for her work to avoid over time.

## 2021-05-10 NOTE — PROGRESS NOTES
Pablo is a 40 year old who is being evaluated via a billable video visit.      How would you like to obtain your AVS? MyChart  If the video visit is dropped, the invitation should be resent by: Text to cell phone: [phone]  Will anyone else be joining your video visit? No      Video Start Time: 9:41 AM           Active Problem List:     Patient Active Problem List    Diagnosis Date Noted     Positive MONROE (antinuclear antibody)      Priority: Medium     Polyarthralgia      Priority: Medium     DDD (degenerative disc disease), lumbar      Priority: Medium            History of Present Illness:   Pablo Ellington is a 40 year old female with PMH of Raynaud's, degenerative disc disease, joint pains evaluated via a billable virtual visit in consultation at request of Dr Paredes in Orthopedics for evaluation of right second MCP joint pain.     HPI from initial visit : She reports pain in her right MCP joint for more than a year.  In September her joint got very swollen and painful.  Denies pain in other MCP, PIP, DIP joints or wrists.  Denies any pain in elbows, ankles, MTP joints.  She has noticed achiness in bilateral shoulders, neck, lower back and occasionally in the right big toe.  In the morning she has stiffness in hands, back, shoulder, neck.  Stiffness is mostly in her right second MCP joint but also noted in the right ring finger and thumb. She take ibuprofen 400 mg 1 - 2 times a day.  She has seen chiropractor and was diagnosed with degenerative disc disease in the lumbar spine.      She had autoimmune testing done in May 2020 which revealed positive MONROE of 1: 160, negative rheumatoid factor, anti-CCP, elevated sed rate of 21, negative IZABEL panel, normal complements.  Her double-stranded DNA was elevated at 10 in May but repeat testing 2 weeks later was normal.    She received intra-articular steroid injection by orthopedic in the second right MCP joint which helped to reduce the pain and inflammation but for  the last few weeks again she has noticed swelling and pain coming back.    She reports family history of lupus in her mother and history of rheumatoid arthritis in her maternal aunt.        She has noticed left sided chest pains. She had plantar fascitis a year ago, it is better now.  Denies history of psoriasis, ulcerative colitis or chron's disease. No h/o iritis, enthesitis, finger or toe swelling like dactylitis or heel pain. She has seen white discoloration in her fingers for years.     Denies malar rash, photosensitivity, recurrent mouth/genital ulcers, sicca symptoms, pleuritic chest pains, recurrent sinusitis/rhinitis, swallowing difficulty, hearing or visual changes recently. No h/o arterial/venous thrombosis in the past. Denies any tick bite, recent GI/ infection.     March 3, 2021 -  She still has lot of pain and swelling in her right second MCP. Shoulders and back hurt. Left big toe hurt. Sometimes ankles hurt. She can not open anything with her right hand. Once in while she feel tightness in  left side of chest which last for few seconds and goes away. She has Raynaud's in her fingers. Reports mild dry eyes and mouth. She denies any fatigue, night sweats.  MRI of the right hand done on 2/24/2021 showed small joint effusion with internal debris/synovial proliferation at the second MCP joint with a few nonspecific subchondral cystlike changes.  Her autoimmune work-up done on 12/28/2020 showed borderline positive MONROE-1: 80 speckled, positive RNP-2.4, positive dsDNA-15, normal C3/C4, negative rheumatoid factor, anti-CCP, normal ESR, CRP, negative IZABEL panel.    March 29, 2021 - She finished the prednisone taper yesterday, on 20 mg dose her symptoms were better, swelling and pain over the right second knuckle resolved but on tapering the prednisone symptoms came back.  She also reports pain in her knees, big toes, shoulders, lower back and neck.  Left knee pain started last week.  Denies any side effects  with prednisone.    May 10, 2021 - She is on Plaquenil 400 mg daily started 6 weeks ago. Her hand is still swollen over right second MCP joint.  Denies any side effects with hydroxychloroquine use.  She is not on systemic steroids.  At work she has to do typing all day long for .  At the end of the day her right hand hurts due to typing.  Also reports some pain in bilateral big toes.  Denies significant pain in other joints.  Reports family history of lupus in mother and RA in maternal relatives.         Review of Systems:     Review Of Systems  Constitutional: denies fever, chills, night sweats and weight loss.  Skin: No skin rash.  Eyes: No dryness or irritation in eyes. No episode of eye inflammation or redness.   Ears/Nose/Throat: no recurrent sinus infections.  Respiratory: No shortness of breath, dyspnea on exertion, cough, or hemoptysis  Cardiovascular: + chest pain or palpitations.  Gastrointestinal: no nausea, vomiting, abdominal pain.  Normal bowel movements.  Genitourinary: no dysuria, frequency  or hematuria.  Musculoskeletal: as in HPI  Neurologic: no numbness, tingling.  Psychiatric: no mood disorders.  Hematologic/Lymphatic/Immunologic: no history of easy bruising, petechia or purpura.  No abnormal bleeding.   Endocrine: no h/o thyroid disease or Diabetes.                  Past Medical History:     Past Medical History:   Diagnosis Date     DDD (degenerative disc disease), lumbar      Polyarthralgia      Positive MONROE (antinuclear antibody)      Past Surgical History:   Procedure Laterality Date     NO HISTORY OF SURGERY              Social History:     Social History     Occupational History     Not on file   Tobacco Use     Smoking status: Current Every Day Smoker     Types: Cigarettes     Smokeless tobacco: Never Used   Substance and Sexual Activity     Alcohol use: Never     Frequency: Never     Drug use: Never     Sexual activity: Yes     Birth control/protection: I.U.D.             Family History:     Family History   Problem Relation Age of Onset     Lupus Mother             Allergies:   No Known Allergies         Medications:     Current Outpatient Medications   Medication Sig Dispense Refill     FLUoxetine (PROZAC) 40 MG capsule Take 40 mg by mouth       hydroxychloroquine (PLAQUENIL) 200 MG tablet Take 1 tablet (200 mg) by mouth 2 times daily Annual Plaquenil toxicity eye screening required. 180 tablet 3     levonorgestrel (MIRENA) 20 MCG/24HR IUD 1 Intra Uterine Device by Intrauterine route       UNABLE TO FIND MEDICATION NAME: tumeric 1 daily       predniSONE (DELTASONE) 5 MG tablet 3tab=15 mg qd x 7 days and taper by 2.5 mg every week till reach at 7.5 mg dose. (Patient not taking: Reported on 5/10/2021) 100 tablet 2     predniSONE (DELTASONE) 5 MG tablet 4tab=20 mg qd x 5 days, 3tab=15 mg qd x 5 days, 2tab=10 mg qd x 5 days, 1 tab=5 mg qd (Patient not taking: Reported on 3/29/2021) 100 tablet 1            Physical Exam:   Vitals not taken.   Wt Readings from Last 4 Encounters:   No data found for Wt       Constitutional: Obese, appearing stated age; cooperative  MS: Slight swelling and tenderness noted over the right second MCP joint.  No swelling noted over other MCP, PIP joint.  She can make a complete fist.  No tenderness reported over bilateral wrists, elbows.  Range of motion of bilateral shoulders is normal but painful.  No knee effusions.  No tenderness reported bilateral ankles, MTP joints.  Psych: nl judgement, orientation, memory, affect.         Data:     No results found for any visits on 05/10/21.  Hemoglobin   Date Value Ref Range Status   12/28/2020 13.4 11.7 - 15.7 g/dL Final     Urea Nitrogen   Date Value Ref Range Status   12/28/2020 15 7 - 30 mg/dL Final     Sed Rate   Date Value Ref Range Status   12/28/2020 15 0 - 20 mm/h Final     CRP Inflammation   Date Value Ref Range Status   12/28/2020 <2.9 0.0 - 8.0 mg/L Final     AST   Date Value Ref Range Status    12/28/2020 20 0 - 45 U/L Final     Albumin   Date Value Ref Range Status   12/28/2020 3.8 3.4 - 5.0 g/dL Final     Alkaline Phosphatase   Date Value Ref Range Status   12/28/2020 61 40 - 150 U/L Final     ALT   Date Value Ref Range Status   12/28/2020 35 0 - 50 U/L Final     Rheumatoid Factor   Date Value Ref Range Status   12/28/2020 <7 <12 IU/mL Final     Recent Labs   Lab Test 12/28/20  0944   WBC 4.9   HGB 13.4   HCT 40.7   MCV 94      BUN 15   AST 20   ALT 35   ALKPHOS 61       Outside studies reviewed: Records from [place] [facility] reviewed    Reviewed Rheumatology lab flowsheet    Assessment    Polyarthralgia  Right second MCP joint synovitis  Lumbar degenerative disc disease  Positive MONROE-1: 160,+ dsDNA-15, + RNP-2.4  Family history of lupus in mother  Raynaud's  Negative rheumatoid factor, anti-CCP, normal ESR, CRP  MRI right hand-synovitis, effusion of second MCP joint with possible subchondral cystic changes    Polyarthralgia: She has pain in the right second MCP joint for more than a year.  She has noticed mild stiffness in her right second, fourth MCP joint and the thumb joint.  Pain has been there for more than a year.  Intra-articular steroid injection done in September 2020 in the right second MCP joint helped to reduce the inflammation but it came back.  Her rheumatoid arthritis tests are negative.  Autoimmune work-up done in December 2020 showed positive MONROE-1: 80 speckled, positive dsDNA, positive RNP, negative rheumatoid factor, anti-CCP, normal ESR, CRP.  She has family history of lupus in her mother.  She does report having Raynaud's in her fingertips.  Based on these abnormal results there is a concern about seronegative inflammatory process and undifferentiated autoimmune connective tissue disease.  MRI of her right hand did show effusion and synovitis of the second MCP joint with possible erosive disease.  Interestingly no other MCP, PIP joints are involved which is atypical of  rheumatoid arthritis.      But due to chronic inflammation in the joint Prednisone taper was given.  She did notice improvement in pain and swelling on 20 mg prednisone.  On tapering the prednisone symptoms came back again. Hydroxychloroquine 400 mg daily for seronegative inflammatory process was started in 3/2021.  She denies any side effects with hydroxychloroquine use but has not noticed significant improvement either.  Hydroxychloroquine will be slow acting and can take 3 to 6 months.  I will continue hydroxychloroquine and follow back with her again in 2 - 3 months.     She has history of chronic pain in her lower back, neck and shoulder which is most likely related to degenerative disc disease.    She can use ibuprofen 400-600 mg 2-3 times a day on as-needed basis.  In addition Voltaren gel can be used topically over the painful joint.    Plan    We will continue hydroxychloroquine 400 mg daily    She can try Voltaren gel topically over the right hand knuckle    Follow-up in 10 weeks in the clinic    Follow-up labs before the next appointment.  Labs to be sent to local clinic      Video-Visit Details    Type of service:  Video Visit    Video End Time: 10:00 AM    Originating Location (pt. Location): Home    Distant Location (provider location):  [facility]     Platform used for Video Visit: Anne Giles MD  [facility]  Department of Rheumatology & Autoimmune Disorders  [employer] [facility] [employer] [place]: [phone]  Pager - [phone]

## 2021-05-11 ASSESSMENT — ANXIETY QUESTIONNAIRES: GAD7 TOTAL SCORE: 19

## 2021-09-19 ENCOUNTER — HEALTH MAINTENANCE LETTER (OUTPATIENT)
Age: 41
End: 2021-09-19

## 2022-04-30 ENCOUNTER — HEALTH MAINTENANCE LETTER (OUTPATIENT)
Age: 42
End: 2022-04-30

## 2022-11-20 ENCOUNTER — HEALTH MAINTENANCE LETTER (OUTPATIENT)
Age: 42
End: 2022-11-20

## 2023-06-02 ENCOUNTER — HEALTH MAINTENANCE LETTER (OUTPATIENT)
Age: 43
End: 2023-06-02

## 2024-02-03 ENCOUNTER — HEALTH MAINTENANCE LETTER (OUTPATIENT)
Age: 44
End: 2024-02-03

## 2024-06-22 ENCOUNTER — HEALTH MAINTENANCE LETTER (OUTPATIENT)
Age: 44
End: 2024-06-22